# Patient Record
Sex: MALE | Race: WHITE | NOT HISPANIC OR LATINO | URBAN - NONMETROPOLITAN AREA
[De-identification: names, ages, dates, MRNs, and addresses within clinical notes are randomized per-mention and may not be internally consistent; named-entity substitution may affect disease eponyms.]

---

## 2022-08-11 ENCOUNTER — OFFICE VISIT (OUTPATIENT)
Dept: URBAN - NONMETROPOLITAN AREA CLINIC 2 | Facility: CLINIC | Age: 37
End: 2022-08-11
Payer: COMMERCIAL

## 2022-08-11 VITALS
HEIGHT: 74 IN | HEART RATE: 70 BPM | BODY MASS INDEX: 36.57 KG/M2 | SYSTOLIC BLOOD PRESSURE: 146 MMHG | WEIGHT: 285 LBS | DIASTOLIC BLOOD PRESSURE: 97 MMHG

## 2022-08-11 DIAGNOSIS — R07.89 ACUTE CHEST WALL PAIN: ICD-10-CM

## 2022-08-11 PROCEDURE — 99203 OFFICE O/P NEW LOW 30 MIN: CPT | Performed by: INTERNAL MEDICINE

## 2022-08-11 RX ORDER — INSULIN ASPART 100 [IU]/ML
AS DIRECTED INJECTION, SOLUTION INTRAVENOUS; SUBCUTANEOUS
Status: ACTIVE | COMMUNITY

## 2022-08-11 RX ORDER — OMEPRAZOLE 40 MG/1
1 CAPSULE 30 MINUTES BEFORE MORNING MEAL CAPSULE, DELAYED RELEASE ORAL ONCE A DAY
Qty: 90 CAPSULE | Refills: 3 | OUTPATIENT
Start: 2022-08-11

## 2022-08-11 NOTE — HPI-TODAY'S VISIT:
Ganesh is a 36-year-old male with a past medical history of type 1 diabetes who presents for evaluation of reflux.  He states that he is chronically been having to take some Pepcid or Tums.  He has been using over-the-counter's.  For the most part these control his complaints.  He is just wondering if there is something different he could or should be taking it.  Denies any melena.  Reports his dad has a lot of issues with acid reflux as well. Sb

## 2022-12-01 ENCOUNTER — OFFICE VISIT (OUTPATIENT)
Dept: URBAN - NONMETROPOLITAN AREA CLINIC 2 | Facility: CLINIC | Age: 37
End: 2022-12-01
Payer: COMMERCIAL

## 2022-12-01 VITALS
TEMPERATURE: 98.1 F | SYSTOLIC BLOOD PRESSURE: 140 MMHG | HEIGHT: 74 IN | HEART RATE: 62 BPM | BODY MASS INDEX: 36.7 KG/M2 | WEIGHT: 286 LBS | DIASTOLIC BLOOD PRESSURE: 90 MMHG

## 2022-12-01 DIAGNOSIS — R07.9 CHEST PAIN, UNSPECIFIED TYPE: ICD-10-CM

## 2022-12-01 PROCEDURE — 99214 OFFICE O/P EST MOD 30 MIN: CPT | Performed by: NURSE PRACTITIONER

## 2022-12-01 RX ORDER — OMEPRAZOLE 40 MG/1
1 CAPSULE 30 MINUTES BEFORE MORNING MEAL CAPSULE, DELAYED RELEASE ORAL ONCE A DAY
Qty: 90 CAPSULE | Refills: 3 | OUTPATIENT

## 2022-12-01 RX ORDER — INSULIN ASPART 100 [IU]/ML
AS DIRECTED INJECTION, SOLUTION INTRAVENOUS; SUBCUTANEOUS
Status: ACTIVE | COMMUNITY

## 2022-12-01 RX ORDER — OMEPRAZOLE 40 MG/1
1 CAPSULE 30 MINUTES BEFORE MORNING MEAL CAPSULE, DELAYED RELEASE ORAL ONCE A DAY
Qty: 90 CAPSULE | Refills: 3 | Status: ACTIVE | COMMUNITY
Start: 2022-08-11

## 2022-12-01 NOTE — HPI-TODAY'S VISIT:
Ganesh is a 36-year-old male with a past medical history of type 1 diabetes who presents for evaluation of reflux.  He states that he is chronically been having to take some Pepcid or Tums. symptoms are well-controlled on omeprazole, but has symptoms if he stops. Denies any melena.  Reports his dad has a lot of issues with acid reflux as well. Sb

## 2023-01-03 ENCOUNTER — OFFICE VISIT (OUTPATIENT)
Dept: URBAN - NONMETROPOLITAN AREA SURGERY CENTER 1 | Facility: SURGERY CENTER | Age: 38
End: 2023-01-03
Payer: COMMERCIAL

## 2023-01-03 ENCOUNTER — WEB ENCOUNTER (OUTPATIENT)
Dept: URBAN - NONMETROPOLITAN AREA SURGERY CENTER 1 | Facility: SURGERY CENTER | Age: 38
End: 2023-01-03

## 2023-01-03 DIAGNOSIS — K21.00 ALKALINE REFLUX ESOPHAGITIS: ICD-10-CM

## 2023-01-03 PROCEDURE — 43239 EGD BIOPSY SINGLE/MULTIPLE: CPT | Performed by: INTERNAL MEDICINE

## 2023-01-03 PROCEDURE — G8907 PT DOC NO EVENTS ON DISCHARG: HCPCS | Performed by: INTERNAL MEDICINE

## 2023-01-27 ENCOUNTER — OFFICE VISIT (OUTPATIENT)
Dept: URBAN - NONMETROPOLITAN AREA SURGERY CENTER 1 | Facility: SURGERY CENTER | Age: 38
End: 2023-01-27

## 2023-06-22 ENCOUNTER — DASHBOARD ENCOUNTERS (OUTPATIENT)
Age: 38
End: 2023-06-22

## 2023-06-22 ENCOUNTER — OFFICE VISIT (OUTPATIENT)
Dept: URBAN - NONMETROPOLITAN AREA CLINIC 2 | Facility: CLINIC | Age: 38
End: 2023-06-22
Payer: COMMERCIAL

## 2023-06-22 VITALS
DIASTOLIC BLOOD PRESSURE: 81 MMHG | BODY MASS INDEX: 36.19 KG/M2 | HEIGHT: 74 IN | WEIGHT: 282 LBS | HEART RATE: 58 BPM | SYSTOLIC BLOOD PRESSURE: 167 MMHG

## 2023-06-22 DIAGNOSIS — K21.9 CHRONIC GERD: ICD-10-CM

## 2023-06-22 DIAGNOSIS — K44.9 HIATAL HERNIA: ICD-10-CM

## 2023-06-22 DIAGNOSIS — K22.70 BARRETT'S ESOPHAGUS WITHOUT DYSPLASIA: ICD-10-CM

## 2023-06-22 DIAGNOSIS — E16.2 HYPOGLYCEMIA: ICD-10-CM

## 2023-06-22 PROBLEM — 302914006: Status: ACTIVE | Noted: 2023-06-22

## 2023-06-22 PROBLEM — 302866003: Status: ACTIVE | Noted: 2023-06-22

## 2023-06-22 PROBLEM — 235595009: Status: ACTIVE | Noted: 2023-06-22

## 2023-06-22 PROCEDURE — 99213 OFFICE O/P EST LOW 20 MIN: CPT | Performed by: NURSE PRACTITIONER

## 2023-06-22 RX ORDER — INSULIN ASPART 100 [IU]/ML
AS DIRECTED INJECTION, SOLUTION INTRAVENOUS; SUBCUTANEOUS
Status: ACTIVE | COMMUNITY

## 2023-06-22 RX ORDER — OMEPRAZOLE 40 MG/1
1 CAPSULE 30 MINUTES BEFORE MORNING MEAL CAPSULE, DELAYED RELEASE ORAL ONCE A DAY
Qty: 90 CAPSULE | Refills: 3 | Status: ACTIVE | COMMUNITY

## 2023-06-22 RX ORDER — OMEPRAZOLE 40 MG/1
1 CAPSULE 30 MINUTES BEFORE MORNING MEAL CAPSULE, DELAYED RELEASE ORAL ONCE A DAY
Qty: 90 CAPSULE | Refills: 3

## 2023-06-22 NOTE — HPI-TODAY'S VISIT:
Ganesh is a 37-year-old male with a past medical history of type 1 diabetes who presents for evaluation of reflux.  He states that he is chronically been having to take some Pepcid or Tums. symptoms are well-controlled on omeprazole, but has symptoms if he stops. Denies any melena.  Reports his dad has a lot of issues with acid reflux as well.  did have syncpal eipsode during OV and EMS was called. Sb 2023 EGD 3 cm HH, barretts, and retained food.

## 2023-06-22 NOTE — PHYSICAL EXAM CONSTITUTIONAL:
well developed, well nourished , in no acute distress , ambulating without difficulty , normal communication ability 
no